# Patient Record
Sex: FEMALE | NOT HISPANIC OR LATINO
[De-identification: names, ages, dates, MRNs, and addresses within clinical notes are randomized per-mention and may not be internally consistent; named-entity substitution may affect disease eponyms.]

---
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
## 2024-01-03 ENCOUNTER — TELEPHONE (OUTPATIENT)
Dept: ENDOCRINOLOGY CLINIC | Facility: CLINIC | Age: 19
End: 2024-01-03

## 2024-01-03 NOTE — TELEPHONE ENCOUNTER
Per Dr. Colin: schedule consult before patient goes back to school - phone # [phone]    Consult scheduled 1/11/24

## 2024-01-11 ENCOUNTER — OFFICE VISIT (OUTPATIENT)
Dept: ENDOCRINOLOGY CLINIC | Facility: CLINIC | Age: 19
End: 2024-01-11

## 2024-01-11 VITALS
HEIGHT: 64 IN | OXYGEN SATURATION: 100 % | WEIGHT: 164.38 LBS | BODY MASS INDEX: 28.06 KG/M2 | DIASTOLIC BLOOD PRESSURE: 67 MMHG | SYSTOLIC BLOOD PRESSURE: 118 MMHG | HEART RATE: 105 BPM

## 2024-01-11 DIAGNOSIS — L65.9 ALOPECIA: ICD-10-CM

## 2024-01-11 DIAGNOSIS — E04.9 GOITER: Primary | ICD-10-CM

## 2024-01-11 PROCEDURE — 3078F DIAST BP <80 MM HG: CPT | Performed by: INTERNAL MEDICINE

## 2024-01-11 PROCEDURE — 3008F BODY MASS INDEX DOCD: CPT | Performed by: INTERNAL MEDICINE

## 2024-01-11 PROCEDURE — 3074F SYST BP LT 130 MM HG: CPT | Performed by: INTERNAL MEDICINE

## 2024-01-11 PROCEDURE — 99204 OFFICE O/P NEW MOD 45 MIN: CPT | Performed by: INTERNAL MEDICINE

## 2024-01-11 NOTE — H&P
New Patient Evaluation - History and Physical    CONSULT - Reason for Visit:  Alopecia and Thyroid Enlargement.  Requesting Physician: Self-Referral.    CHIEF COMPLAINT:    Chief Complaint   Patient presents with    Consult     Hair loss, dx by derm with alopecia, thyroid enlargement on right side x 2 week ago         HISTORY OF PRESENT ILLNESS:   Linda Pandya is a 18 year old female who presents with alopecia and thyroid enlargement.     She had been struggling with hair loss at the base of her scalp and seeing a dermatologist for this. She has been prescribed steroids. This has been working well for her, but she would like to look at other possible modifiers of hair loss.     PAST MEDICAL HISTORY:   No past medical history on file.    PAST SURGICAL HISTORY:   No past surgical history on file.    SOCIAL HISTORY:    Social History     Socioeconomic History    Marital status: Single       FAMILY HISTORY:   No family history on file.    CURRENT MEDICATIONS:    No current outpatient medications on file.       ALLERGIES:  No Known Allergies      ASSESSMENTS:     REVIEW OF SYSTEMS:  Constitutional: Negative for: weight change, fever, fatigue, cold/heat intolerance  Eyes: Negative for:  Visual changes, proptosis, blurring  ENT: Negative for:  dysphagia, neck swelling, dysphonia  Respiratory: Negative for:  dyspnea, cough  Cardiovascular: Negative for:  chest pain, palpitations, orthopnea  GI: Negative for:  abdominal pain, nausea, vomiting, diarrhea, constipation, bleeding  Neurology: Negative for: headache, numbness, weakness  Genito-Urinary: Negative for: dysuria, frequency  Psychiatric: Negative for:  depression, anxiety  Hematology/Lymphatics: Negative for: bruising, lower extremity edema  Endocrine: Negative for: polyuria, polydypsia  Skin: Negative for: rash, blister, cellulitis,      PHYSICAL EXAM:   Vitals:    01/11/24 1451   BP: 118/67   Pulse: 105   SpO2: 100%   Weight: 164 lb 6.4 oz (74.6 kg)   Height: 5' 4\"  (1.626 m)     BMI: Body mass index is 28.22 kg/m².     CONSTITUTIONAL:  awake, alert, cooperative, no apparent distress, and appears stated age  PSYCH: normal affect  EYES:  No proptosis, no ptosis, conjunctiva normal  ENT:  Normocephalic, atraumatic  NECK:  Supple, symmetrical, trachea midline, no adenopathy, thyroid symmetric, enlarged and no tenderness  HEMATOLOGIC/LYMPHATICS:  no cervical lymphadenopathy and no supraclavicular lymphadenopathy  LUNGS: clear to auscultation bilaterally, no crackles or wheezing  CARDIOVASCULAR:  regular rate and rhythm, normal S1 and S2  ABDOMEN:  soft  SKIN:  no bruising or bleeding, no rashes and no lesions  EXTREMITIES: no edema    DATA:   Reviewed    ASSESSMENT AND PLAN: This is a 18 year-old woman here for evaluation and management of alopecia as well as goiter. I would like to evaluate all of the different causes for hair loss and also check a thyroid US. I will follow up with patient once test results are back.    Prior to this encounter, I spent over 20 minutes with preparing for the visit, reviewing documents from other specialties as well as from PCP, and going over test results and imaging studies. During the face to face encounter, I spent an additional 30 minutes which were determined for history-taking, physical examination, and orientation regarding our services. Greater than 50% of the time was spent in counseling, anticipatory guidance, and coordination of care. Patient concerns were answered to the best of my knowledge.         1/11/2024  Kenyatta Colin MD